# Patient Record
Sex: MALE | Race: WHITE | NOT HISPANIC OR LATINO | Employment: FULL TIME | URBAN - METROPOLITAN AREA
[De-identification: names, ages, dates, MRNs, and addresses within clinical notes are randomized per-mention and may not be internally consistent; named-entity substitution may affect disease eponyms.]

---

## 2017-03-24 ENCOUNTER — HOSPITAL ENCOUNTER (EMERGENCY)
Facility: HOSPITAL | Age: 47
Discharge: HOME | End: 2017-03-24
Attending: EMERGENCY MEDICINE | Admitting: EMERGENCY MEDICINE

## 2017-03-24 VITALS
HEART RATE: 83 BPM | OXYGEN SATURATION: 97 % | WEIGHT: 192 LBS | HEIGHT: 72 IN | SYSTOLIC BLOOD PRESSURE: 143 MMHG | BODY MASS INDEX: 26.01 KG/M2 | RESPIRATION RATE: 16 BRPM | DIASTOLIC BLOOD PRESSURE: 83 MMHG | TEMPERATURE: 97.7 F

## 2017-03-24 DIAGNOSIS — T15.91XA FOREIGN BODY OF RIGHT EYE, INITIAL ENCOUNTER: Primary | ICD-10-CM

## 2017-03-24 PROCEDURE — 99283 EMERGENCY DEPT VISIT LOW MDM: CPT

## 2017-03-24 PROCEDURE — 25010000002 TDAP 5-2.5-18.5 LF-MCG/0.5 SUSPENSION: Performed by: EMERGENCY MEDICINE

## 2017-03-24 PROCEDURE — 90715 TDAP VACCINE 7 YRS/> IM: CPT | Performed by: EMERGENCY MEDICINE

## 2017-03-24 PROCEDURE — 99284 EMERGENCY DEPT VISIT MOD MDM: CPT | Performed by: EMERGENCY MEDICINE

## 2017-03-24 PROCEDURE — 90471 IMMUNIZATION ADMIN: CPT | Performed by: EMERGENCY MEDICINE

## 2017-03-24 PROCEDURE — 65222 REMOVE FOREIGN BODY FROM EYE: CPT | Performed by: EMERGENCY MEDICINE

## 2017-03-24 RX ORDER — PROPARACAINE HYDROCHLORIDE 5 MG/ML
2 SOLUTION/ DROPS OPHTHALMIC ONCE
Status: COMPLETED | OUTPATIENT
Start: 2017-03-24 | End: 2017-03-24

## 2017-03-24 RX ORDER — CIPROFLOXACIN HYDROCHLORIDE 3.5 MG/ML
2 SOLUTION/ DROPS TOPICAL
Qty: 10 ML | Refills: 0 | Status: SHIPPED | OUTPATIENT
Start: 2017-03-24 | End: 2017-03-29

## 2017-03-24 RX ORDER — GABAPENTIN 300 MG/1
CAPSULE ORAL
COMMUNITY
Start: 2014-11-17

## 2017-03-24 RX ORDER — FINASTERIDE 1 MG/1
TABLET, FILM COATED ORAL
COMMUNITY
Start: 2014-10-17

## 2017-03-24 RX ORDER — HYDROCODONE BITARTRATE AND ACETAMINOPHEN 5; 325 MG/1; MG/1
2 TABLET ORAL ONCE
Status: COMPLETED | OUTPATIENT
Start: 2017-03-24 | End: 2017-03-24

## 2017-03-24 RX ORDER — CIPROFLOXACIN HYDROCHLORIDE 3.5 MG/ML
2 SOLUTION/ DROPS TOPICAL ONCE
Status: COMPLETED | OUTPATIENT
Start: 2017-03-24 | End: 2017-03-24

## 2017-03-24 RX ORDER — MONTELUKAST SODIUM 10 MG/1
TABLET ORAL
COMMUNITY
Start: 2014-03-31

## 2017-03-24 RX ORDER — HYDROCODONE BITARTRATE AND ACETAMINOPHEN 5; 325 MG/1; MG/1
1 TABLET ORAL EVERY 4 HOURS PRN
Qty: 30 TABLET | Refills: 0 | Status: SHIPPED | OUTPATIENT
Start: 2017-03-24

## 2017-03-24 RX ADMIN — HYDROCODONE BITARTRATE AND ACETAMINOPHEN 2 TABLET: 5; 325 TABLET ORAL at 21:35

## 2017-03-24 RX ADMIN — PROPARACAINE HYDROCHLORIDE 2 DROP: 5 SOLUTION/ DROPS OPHTHALMIC at 20:55

## 2017-03-24 RX ADMIN — CIPROFLOXACIN HYDROCHLORIDE 2 DROP: 3 SOLUTION/ DROPS OPHTHALMIC at 21:30

## 2017-03-24 RX ADMIN — TETANUS TOXOID, REDUCED DIPHTHERIA TOXOID AND ACELLULAR PERTUSSIS VACCINE, ADSORBED 0.5 ML: 5; 2.5; 8; 8; 2.5 SUSPENSION INTRAMUSCULAR at 21:29

## 2017-03-25 NOTE — DISCHARGE INSTRUCTIONS
User eyedrops as directed.  Take your pain medication as prescribed as needed.  Stay in a darkened room for 1-2 days and/or wear sunglasses.  Follow with Dr. Morrissey on Monday.  Return if you have increasing pain, purulent drainage, worsening of vision, worse in anyway at all.

## 2017-03-25 NOTE — ED PROVIDER NOTES
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  This has been going on for last couple of days.  The patient has had some watery drainage, no purulent drainage.  Patient does not know when his last tetanus shot was.    PCP: Fawn Candelaria      Review of Systems   Eyes: Positive for photophobia, pain, discharge (watery discharge) and redness. Negative for itching and visual disturbance.        Medication List      ASK your doctor about these medications          finasteride 1 MG tablet   Commonly known as:  PROPECIA       gabapentin 300 MG capsule   Commonly known as:  NEURONTIN       montelukast 10 MG tablet   Commonly known as:  SINGULAIR           Past Medical History:   Diagnosis Date   • Neuropathy    • Trigeminal neuralgia        No Known Allergies    Past Surgical History:   Procedure Laterality Date   • APPENDECTOMY     • HAND SURGERY     • TOE SURGERY         History reviewed. No pertinent family history.    Social History     Social History   • Marital status:      Spouse name: N/A   • Number of children: N/A   • Years of education: N/A     Social History Main Topics   • Smoking status: Never Smoker   • Smokeless tobacco: None   • Alcohol use Yes   • Drug use: No   • Sexual activity: Not Asked     Other Topics Concern   • None     Social History Narrative   • None           Objective   Physical Exam   Constitutional: He appears well-developed and well-nourished. No distress.   ED Triage Vitals:  Temp: 97.7 °F (36.5 °C) (03/24/17 2044)  Heart Rate: 83 (03/24/17  2044)  Resp: 16 (03/24/17 2044)  BP: 143/83 (03/24/17 2044)  SpO2: 97 % (03/24/17 2044)  Temp src: Oral (03/24/17 2044)  Heart Rate Source: n/a  Patient Position: n/a  BP Location: n/a  FiO2 (%): n/a    The patient's vitals were reviewed by me.  Unless otherwise noted they are within normal limits.     Eyes: Pupils are equal, round, and reactive to light. Right eye exhibits discharge (Clear). No scleral icterus.   The visual acuity was noted.  See the nurse's documentation.  The lids and lashes are normal.  The conjunctiva is injected and red.  Pupils equal round reactive to light and accommodation.  The extra ocular muscles are intact. The eye was numbed using proparacaine.   The fluorescein stain is positive at 12:00.  There appears to be a foreign body on the cornea.  The optic disc is sharp, the retina is intact.  Using a moistened Q-tip an attempt was made to remove the foreign body.  Using the slit-lamp it appears that there is a rust ring and not a metallic foreign body.   An eye bur was used to remove most of the rust ring. There is no hyphema, no pus in anterior chamber.   Nursing note and vitals reviewed.      Procedures         ED Course  ED Course      9:28 PM, 03/24/17:  It was discussed with the patient that he had partial removal of the rust ring.  The plan will be to have the patient follow-up with Dr. Morrissey for recheck on Monday.  His been counseled to use eyedrops and pain medications as directed.  He has been counseled to remain in a dark room for 1-2 days, he should wear sunglasses if he is in the light or sunshine.  The patient should return if he has increasing pain, visual change, purulent drainage from the eye, worse in any way at all.  All the patient's questions were answered he will be discharged in good condition.            [facility]  No orders to display     Labs Reviewed - No data to display  No results found.    Final diagnoses:   None         ED Medications:  Medications   fluorescein  ophthalmic strip 1 strip (not administered)   proparacaine (ALCAINE) 0.5 % ophthalmic solution 2 drop (2 drops Right Eye Given 3/24/17 2055)       New Medications:     Medication List      ASK your doctor about these medications          finasteride 1 MG tablet   Commonly known as:  PROPECIA       gabapentin 300 MG capsule   Commonly known as:  NEURONTIN       montelukast 10 MG tablet   Commonly known as:  SINGULAIR           Stopped Medications:     Medication List      ASK your doctor about these medications          finasteride 1 MG tablet   Commonly known as:  PROPECIA       gabapentin 300 MG capsule   Commonly known as:  NEURONTIN       montelukast 10 MG tablet   Commonly known as:  SINGULAIR             Final diagnoses:   Foreign body of right eye, initial encounter            Jurgen Cowan MD  03/24/17 2124       Jurgen Cowan MD  03/24/17 2128       Jurgen Cowan MD  03/24/17 2130